# Patient Record
Sex: FEMALE | Race: WHITE | Employment: OTHER | URBAN - METROPOLITAN AREA
[De-identification: names, ages, dates, MRNs, and addresses within clinical notes are randomized per-mention and may not be internally consistent; named-entity substitution may affect disease eponyms.]

---

## 2023-09-29 ENCOUNTER — OFFICE VISIT (OUTPATIENT)
Dept: CARDIOLOGY CLINIC | Age: 73
End: 2023-09-29
Payer: MEDICARE

## 2023-09-29 VITALS
BODY MASS INDEX: 24.49 KG/M2 | WEIGHT: 147 LBS | OXYGEN SATURATION: 97 % | DIASTOLIC BLOOD PRESSURE: 110 MMHG | HEART RATE: 79 BPM | HEIGHT: 65 IN | SYSTOLIC BLOOD PRESSURE: 156 MMHG

## 2023-09-29 DIAGNOSIS — I25.10 CORONARY ARTERY DISEASE INVOLVING NATIVE CORONARY ARTERY OF NATIVE HEART WITHOUT ANGINA PECTORIS: ICD-10-CM

## 2023-09-29 DIAGNOSIS — R07.2 PRECORDIAL PAIN: ICD-10-CM

## 2023-09-29 DIAGNOSIS — R42 DIZZINESS: ICD-10-CM

## 2023-09-29 DIAGNOSIS — Z86.73 HISTORY OF STROKE: ICD-10-CM

## 2023-09-29 DIAGNOSIS — Z76.89 ESTABLISHING CARE WITH NEW DOCTOR, ENCOUNTER FOR: Primary | ICD-10-CM

## 2023-09-29 DIAGNOSIS — R01.1 MURMUR: ICD-10-CM

## 2023-09-29 DIAGNOSIS — R06.02 SOB (SHORTNESS OF BREATH): ICD-10-CM

## 2023-09-29 DIAGNOSIS — R07.9 CHEST PAIN, UNSPECIFIED TYPE: ICD-10-CM

## 2023-09-29 PROCEDURE — G8400 PT W/DXA NO RESULTS DOC: HCPCS | Performed by: INTERNAL MEDICINE

## 2023-09-29 PROCEDURE — 1036F TOBACCO NON-USER: CPT | Performed by: INTERNAL MEDICINE

## 2023-09-29 PROCEDURE — 3017F COLORECTAL CA SCREEN DOC REV: CPT | Performed by: INTERNAL MEDICINE

## 2023-09-29 PROCEDURE — 99204 OFFICE O/P NEW MOD 45 MIN: CPT | Performed by: INTERNAL MEDICINE

## 2023-09-29 PROCEDURE — 93000 ELECTROCARDIOGRAM COMPLETE: CPT | Performed by: INTERNAL MEDICINE

## 2023-09-29 PROCEDURE — G8427 DOCREV CUR MEDS BY ELIG CLIN: HCPCS | Performed by: INTERNAL MEDICINE

## 2023-09-29 PROCEDURE — 1090F PRES/ABSN URINE INCON ASSESS: CPT | Performed by: INTERNAL MEDICINE

## 2023-09-29 PROCEDURE — 1123F ACP DISCUSS/DSCN MKR DOCD: CPT | Performed by: INTERNAL MEDICINE

## 2023-09-29 PROCEDURE — G8420 CALC BMI NORM PARAMETERS: HCPCS | Performed by: INTERNAL MEDICINE

## 2023-09-29 RX ORDER — NADOLOL 40 MG/1
40 TABLET ORAL 2 TIMES DAILY
COMMUNITY
Start: 2018-09-13

## 2023-09-29 RX ORDER — EZETIMIBE 10 MG/1
10 TABLET ORAL DAILY
Qty: 90 TABLET | Refills: 3 | Status: SHIPPED | OUTPATIENT
Start: 2023-09-29

## 2023-09-29 NOTE — PROGRESS NOTES
CLAYI:  Paola is faxing it over   [facility]   Cardiac Consultation    Referring Provider:  No primary care provider on file. Chief Complaint   Patient presents with    New Patient    Chest Pain     Cp comes and goes. Not active. Shortness of Breath     At times, not today. Usually after a bad CP episode. Dizziness     If cp hurts for awhile. Gordo Wright   1950    History of Present Illness:    Gordo Wright is a 74 y.o. female who is here today as a new patient consult at the request of PCP for chest pain. Today she states she followed with cardiology 5-10 years ago due to chest pain. She states she has an angiogram which showed a blockage, she does not think she had a stent placed. She remembers that being told that she had some blockage. She is a non smoker and does not drink alcohol. She states her blood pressure raises when she stands up. Blood pressure at rest runs 120/70's while at rest at home. She is taking Nadolol which was stared 40 years ago for an arrhythmia. She has taken ASA in the past, stopped due to bleeding easily. She states she has been having chest pain with exertion, with certain position changes, also randomly. Chest pain started 6 months ago and radiates into her left shoulder, feels like a tightness or fullness. Pain last for 10-20 minutes, resolves when she lays down and rest. Some associated SOB if she does not lay down to rest. Patient currently denies any weight gain, edema, palpitations, dizziness, and syncope. She states she had a stroke 5-6 years ago. Past Medical History:   has a past medical history of CAD (coronary artery disease), Cerebral artery occlusion with cerebral infarction ([address]), Hyperlipidemia, and Hypertension. Surgical History:   has a past surgical history that includes Diagnostic Cardiac Cath Lab Procedure. Social History:   reports that she has never smoked.  She has never used smokeless tobacco. She reports that she does not drink alcohol and does

## 2023-09-29 NOTE — PATIENT INSTRUCTIONS
Plan:  ~Recommend a stress test- Lexiscan Myoview to evaluate chest pain. Patient is not able to walk on a treadmill   ~Recommend an echocardiogram which is an ultrasound of your heart to evaluate heart function, structures and valves. ~Labs- CBC, CMP, Lipids, TSH, and Mag   ~Carotid dopplers   ~Discussed benefits VS risk of Aspirin therapy- discussed that you would need to be on Aspirin if you need stent placement   ~Start Zetia 10 mg daily   Cardiac medications reviewed including indications and pertinent side effects. Medication list updated at this visit. Check blood pressure and heart rate at home a few times per week- keep a log with dates and times and bring to office visit   Regular exercise and following a healthy diet encouraged   Follow up with me in 6-8 weeks     Your provider has ordered testing for further evaluation. An order/prescription has been included in your paper work. To schedule outpatient testing, contact Central Scheduling by calling [phone] ([phone]).

## 2023-10-19 ENCOUNTER — HOSPITAL ENCOUNTER (OUTPATIENT)
Dept: NON INVASIVE DIAGNOSTICS | Age: 73
Discharge: HOME | End: 2023-10-19
Attending: INTERNAL MEDICINE